# Patient Record
(demographics unavailable — no encounter records)

---

## 2024-10-24 NOTE — DATA REVIEWED
[FreeTextEntry1] : Her previous radiographs are reviewed showing a calcification dorsally.  Its at the level of the IP joint of the thumb there is bone spurs present along the proximal phalanx at the IP joint.  Generative change at the CMC joint

## 2024-10-24 NOTE — HISTORY OF PRESENT ILLNESS
[de-identified] : 58-year-old female pain discomfort in her right thumb she is not working she is at the same level of disability.  I am only treating her thumb.  Her injury was about 18 months ago and she never returned to work activities.  Complaining about significant pain discomfort in the right thumb.

## 2024-10-24 NOTE — PHYSICAL EXAM
[de-identified] : Patient is tender to palpation at the IP joint of the right thumb.  He has a palpable bone spur present.  She can move the thumb but it causes pain and discomfort.  No instability she has pain along the basal joint as well.

## 2024-10-24 NOTE — ASSESSMENT
[FreeTextEntry1] : Patient is pain discomfort in her right thumb.  I did try her on a Medrol Dosepak at her last visit she got no better with that.  Comes in today for evaluation possibility of cortisone injections at the base of the thumb we discussed the possibility of debridement of the bone spurs with continued pain and discomfort was discussed as well.  Stability of a DIP joint fusion was discussed.  Wrapped her up into a Coban bandage.  And the Coban present course of dorsal aspect of the thumb causes her significant pain and discomfort.  She is taken diffusions under consideration.  Taken the debridement under consideration.  See me back on an as-needed basis.  Reports that she has filed for sore security disability.